# Patient Record
(demographics unavailable — no encounter records)

---

## 2024-11-06 NOTE — REASON FOR VISIT
[Cardiac Failure] : cardiac failure [Coronary Artery Disease] : coronary artery disease [FreeTextEntry1] : Chief complaint Feeling tired due to frequent urination at night.  History of present illness - Reports feeling tired, which he attributes to frequent urination at night. - Reports waking up frequently to urinate, which he believes is due to his medication. - Reports exercising regularly, doing cardio and aerobics five or six times a week and strength training one to two times a week. - Reports feeling more tired on some days than others, but not more than usual.  Past medical history Reports having to urinate frequently since childhood.  Social history - Reports engaging in regular exercise, including cardio, aerobics, and strength training. 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  Imaging results Echocardiogram done in June shows ejection fraction at around 35%.  Assessment - Patient's blood pressure is well-controlled. - Patient's heart function is stable with an ejection fraction of around 35%. - Patient's fatigue may be due to nocturia, which may be secondary to his medication or a potential prostatic condition.  Plan - Recommend patient to undergo laboratory blood tests. - Plan to recheck echocardiogram in June. - Plan to review patient's pharmacotherapy and dosing schedule to determine if adjustments can be made to reduce nocturia. - Recommend patient to consult a primary care physician for a prostate examination and age appropriate cancer screening.  Prescription Refill for Entresto sent to [facility] on [address].  Appointments - Plan to see patient back in three months. - Plan to call patient as soon as blood work results are available.

## 2024-11-06 NOTE — PHYSICAL EXAM
Physical Therapy    Physical Therapy Treatment    Patient Name: Alo Galss  MRN: [id number]  Department: [place] [id number]  Room: [address] [place] [address]  Today's Date: 9/17/2024  Time Calculation  Start Time: 0846  Stop Time: 0917  Time Calculation (min): 31 min         Assessment/Plan   PT Assessment  End of Session Communication: Bedside nurse  Assessment Comment: Pt demo's deconditioning and limited activity tolerance in PT tx though is motivated to participate. Pt tolerates sitting at EOB x15 min duration but unable to attempt sit>stand this date d/t fatigue.  End of Session Patient Position: Bed, 3 rail up, Alarm on (B SCDs in place, call light and needs in reach)  PT Plan  Inpatient/Swing Bed or Outpatient: Inpatient  PT Plan  Treatment/Interventions: Bed mobility, Therapeutic exercise, Endurance training  PT Plan: Ongoing PT  PT Frequency: 4 times per week  PT Discharge Recommendations: Moderate intensity level of continued care  Equipment Recommended upon Discharge: Other (comment) (TBD)  PT Recommended Transfer Status: Total assist  PT - OK to Discharge: Yes (per POC)      General Visit Information:   PT  Visit  PT Received On: 09/17/24  General  Reason for Referral: Impaired Mobility: Pt is a 69 y.o. male found non responsive at home by his neighbor and EMS was called.  Referred By: Mónica Johns MD  Past Medical History Relevant to Rehab:   Past Medical History:   Diagnosis Date    CHF (congestive heart failure) (Multi)     COPD (chronic obstructive pulmonary disease) (Multi)     Diabetes mellitus (Multi)     Hypertension     Obesity     MELISSA (obstructive sleep apnea)     Personal history of other specified conditions 01/08/2023    History of impaired glucose tolerance       Family/Caregiver Present: No  Prior to Session Communication: Bedside nurse  Patient Position Received: Bed, 3 rail up, Alarm on  Preferred Learning Style: auditory, kinesthetic, verbal, visual, written  General Comment: Pt semi-supine in bed on  arrival, pleasant and agreeable for PT tx    Subjective   Precautions:  Precautions  Medical Precautions: Fall precautions, Oxygen therapy device and L/min (11L high flow via NC)  Precautions Comment: Confusion, hx of falls    Vital Signs (Past 2hrs)        Date/Time Vitals Session Patient Position Pulse Resp SpO2 BP MAP (mmHg)    09/17/24 0738 --  --  73  17  97 %  95/63  --     09/17/24 0846 During PT  --  --  --  --  --  --                     Objective   Pain:  Pain Assessment  Pain Assessment: 0-10  0-10 (Numeric) Pain Score: 0 - No pain  Cognition:  Cognition  Overall Cognitive Status: Within Functional Limits  Coordination:  Movements are Fluid and Coordinated: No  Postural Control:  Postural Control  Postural Control: Impaired  Static Sitting Balance  Static Sitting-Balance Support: Feet supported, Bilateral upper extremity supported  Static Sitting-Level of Assistance: Close supervision  Static Sitting-Comment/Number of Minutes: x15 min unsupported sitting at EOB    Activity Tolerance:  Activity Tolerance  Endurance: Tolerates 10 - 20 min exercise with multiple rests  Treatments:  Therapeutic Exercise  Therapeutic Exercise Performed: Yes  Therapeutic Exercise Activity 1: Pt instructed in B LE ther-ex: Supine AP x20 reps and SAQ x10 reps. Seated LAQ on R LE x4 reps and unable to complete additional ther-ex d/t fatigue and FERRELL.    Therapeutic Activity  Therapeutic Activity Performed: Yes  Therapeutic Activity 1: Pt performs static/dynamic unsupported sitting at EOB x15 min duration with varying assist of CGA-SBA x1 for balance and max safety. Pt is encouraged to attempt sit>stand, however he is citing fatigue and requesting to return to supine.    Bed Mobility  Bed Mobility: Yes  Bed Mobility 1  Bed Mobility 1: Supine to sitting  Level of Assistance 1: Maximum assistance  Bed Mobility Comments 1: HOB elevated, assist at B LEs and L UE to bed rail  Bed Mobility 2  Bed Mobility  2: Sitting to supine  Level of  Assistance 2: Maximum assistance, +2  Bed Mobility 3  Bed Mobility 3: Scooting  Level of Assistance 3: Dependent, +2  Bed Mobility Comments 3: in supine closer to HOB with use of draw sheet    Outcome Measures:  [facility] Basic Mobility  Turning from your back to your side while in a flat bed without using bedrails: A lot  Moving from lying on your back to sitting on the side of a flat bed without using bedrails: A lot  Moving to and from bed to chair (including a wheelchair): Total  Standing up from a chair using your arms (e.g. wheelchair or bedside chair): Total  To walk in hospital room: Total  Climbing 3-5 steps with railing: Total  Basic Mobility - Total Score: 8      Encounter Problems       Encounter Problems (Active)       Balance       STG - Maintains static sitting balance with upper extremity support At CGA, safely, without LOB, device PRN  (Progressing)       Start:  09/15/24    Expected End:  09/29/24       INTERVENTIONS:  1. Practice sitting on the edge of a bed/mat with minimal support.  2. Educate patient about maintining total hip precautions while maintaining balance.  3. Educate patient about pressure relief.  4. Educate patient about use of assistive device.         STG - Maintains dynamic sitting balance with upper extremity support At Min Ax1, safely, without LOB, device PRN  (Progressing)       Start:  09/15/24    Expected End:  09/29/24       INTERVENTIONS:  1. Practice sitting on the edge of a bed/mat with minimal support.  2. Educate patient about maintining total hip precautions while maintaining balance.  3. Educate patient about pressure relief.  4. Educate patient about use of assistive device.            Mobility       Endurance - Pt to tolerate >/= 20 minutes therex, theract, gait and/or NMR with </= 5 minutes of rest breaks  (Progressing)       Start:  09/15/24    Expected End:  09/29/24               PT Transfers       STG - Patient will perform bed mobility  At CGA, safely, without  LOB, device PRN  (Progressing)       Start:  09/15/24    Expected End:  09/29/24            STG - Patient will transfer sit to and from stand At Mod Ax1, safely, without LOB, device PRN  (Progressing)       Start:  09/15/24    Expected End:  09/29/24               Pain - Adult          Safety       Pt will verbalize and apply safety awareness and precautions 100% of time throughout entire session   (Progressing)       Start:  09/15/24    Expected End:  09/29/24                    [Well Developed] : well developed [Well Nourished] : well nourished [No Acute Distress] : no acute distress [Normal Conjunctiva] : normal conjunctiva [Normal Venous Pressure] : normal venous pressure [No Carotid Bruit] : no carotid bruit [Normal S1, S2] : normal S1, S2 [No Murmur] : no murmur [No Rub] : no rub [No Gallop] : no gallop [Clear Lung Fields] : clear lung fields [Good Air Entry] : good air entry [No Respiratory Distress] : no respiratory distress  [Soft] : abdomen soft [Non Tender] : non-tender [No Masses/organomegaly] : no masses/organomegaly [Normal Bowel Sounds] : normal bowel sounds [Normal Gait] : normal gait [No Edema] : no edema [No Cyanosis] : no cyanosis [No Clubbing] : no clubbing [No Varicosities] : no varicosities [No Rash] : no rash [No Skin Lesions] : no skin lesions [Moves all extremities] : moves all extremities [No Focal Deficits] : no focal deficits [Normal Speech] : normal speech [Alert and Oriented] : alert and oriented [Normal memory] : normal memory

## 2024-11-06 NOTE — ASSESSMENT
[FreeTextEntry1] : Plan - Recommend patient to undergo laboratory blood tests. - Plan to recheck echocardiogram in June. - Plan to review patient's pharmacotherapy and dosing schedule to determine if adjustments can be made to reduce nocturia. - Recommend patient to consult a primary care physician for a prostate examination and age appropriate cancer screening.

## 2025-01-16 NOTE — ASSESSMENT
[FreeTextEntry1] : 1. Chronic systolic heart failure.  The patient is euvolemic on exam. Symptoms do not suggest volume overload. On appropriate secondary prevention medications. 2. HLD. Continue atorvastatin. LDL 60. 3. CAD. ECG done, no changes. No angina. f/u 6m

## 2025-01-16 NOTE — REASON FOR VISIT
[Symptom and Test Evaluation] : symptom and test evaluation [Cardiac Failure] : cardiac failure [Hyperlipidemia] : hyperlipidemia [Coronary Artery Disease] : coronary artery disease [FreeTextEntry1] : Generally doing well, no CP or dyspnea. Added strength training to workouts Engaged in gu[employer]mnigyr playing and poetry Tolerating meds  1. Chronic systolic heart failure.  The patient is euvolemic on exam. Symptoms do not suggest volume overload. On appropriate secondary prevention medications. 2. HLD. Continue atorvastatin. LDL 60. 3. CAD. ECG done, no changes. No angina. f/u 6m

## 2025-02-26 NOTE — REASON FOR VISIT
[Symptom and Test Evaluation] : symptom and test evaluation [Cardiac Failure] : cardiac failure [FreeTextEntry1] : Pt had mild salt load before symptoms. Better now with diuretics, but not back to normal.  Feeling a little dizzy as well.  1. Hold metoprolol for now to allow for diuresis. 2. Continue oral diuretics for now 3. Will need pharm nuclear stress test and CT chest f/u 2 weeks

## 2025-02-26 NOTE — ASSESSMENT
[FreeTextEntry1] : 1. Hold metoprolol for now to allow for diuresis. 2. Continue oral diuretics for now 3. Will need pharm nuclear stress test and CT chest f/u 2 weeks

## 2025-03-12 NOTE — ASSESSMENT
[FreeTextEntry1] : 1. Chronic systolic heart failure.  The patient is euvolemic on exam. Symptoms do not suggest volume overload. On appropriate secondary prevention medications. Would like to restart metoprolol succinate 2. f/u 6 w

## 2025-03-12 NOTE — REASON FOR VISIT
[Cardiac Failure] : cardiac failure [FreeTextEntry1] : Doing better, limited inhaler use Walking on treadmill, but slower pace No angina  1. Chronic systolic heart failure.  The patient is euvolemic on exam. Symptoms do not suggest volume overload. On appropriate secondary prevention medications. Would like to restart metoprolol succinate 2. f/u 6 w

## 2025-04-24 NOTE — ASSESSMENT
[FreeTextEntry1] : 1.  Chronic systolic heart failure.  The patient is euvolemic on exam. Symptoms do not suggest volume overload. On appropriate secondary prevention medications. 2. CAD. ECG done, no changes. No angina. 3. HLD. Continue atorvastatin.

## 2025-04-24 NOTE — REASON FOR VISIT
[Symptom and Test Evaluation] : symptom and test evaluation [Cardiac Failure] : cardiac failure [FreeTextEntry1] : Back to full exercise capacity. No dyspnea or dizziness. BP still precludes advancing GDMT.  1.  Chronic systolic heart failure.  The patient is euvolemic on exam. Symptoms do not suggest volume overload. On appropriate secondary prevention medications. 2. CAD. ECG done, no changes. No angina. 3. HLD. Continue atorvastatin.

## 2025-07-15 NOTE — REASON FOR VISIT
[Symptom and Test Evaluation] : symptom and test evaluation [Cardiac Failure] : cardiac failure [Coronary Artery Disease] : coronary artery disease [FreeTextEntry1] : Generally doing well, no CP or dyspnea. Exercising beyond previous capacity No dizziness  1. CAD. ECG done, no changes. No angina. 2. Chronic systolic heart failure.  The patient is euvolemic on exam. Symptoms do not suggest volume overload. On appropriate secondary prevention medications. Little incremental benefit from advancing GDMT given low BP and profound side effects previously. Good functional capacity.

## 2025-07-15 NOTE — ASSESSMENT
[FreeTextEntry1] : 1. CAD. ECG done, no changes. No angina. 2. Chronic systolic heart failure.  The patient is euvolemic on exam. Symptoms do not suggest volume overload. On appropriate secondary prevention medications. Little incremental benefit from advancing GDMT given low BP and profound side effects previously. Good functional capacity.